# Patient Record
(demographics unavailable — no encounter records)

---

## 2024-10-26 NOTE — ASSESSMENT
[FreeTextEntry1] : 40 y/o female presents with recent episode of biliary pancreatitis. Likely has been having biliary colic symptoms for months. Discussed need for minimally invasive cholecystectomy with possible intra-operative cholangiogram/CBDE. Will need to repeat blood work and obtain MRCP to assess pancreatitis resolution and clear CBD. Risks/benefits/surgical details and expected recovery explained. All questions answered. She wishes to proceed soon. Patient has been instructed to present to [facility] ER should she develop recurrent symptoms prior to scheduled surgery.

## 2024-10-26 NOTE — REASON FOR VISIT
[Initial Consultation] : an initial consultation for [Referred By: ___] : Referred By: [unfilled] [FreeTextEntry2] : biliary pancreatitis

## 2024-10-26 NOTE — HISTORY OF PRESENT ILLNESS
[de-identified] : Ms. Stover is a 40 y/o female who recently presented to outside hospital ER with severe abdominal pain, nausea 10/20/2024. Blood work showed elevated lipase > 3000 and mildly elevated total bilirubin at 1.5 mg/dL.  AST/ALT/ALK PHOS were also elevated at 330/222/382 U/L. Abdominal ultrasound 10/20/2024 showed multiple gallstones with normal CBD measuring 6 mm. She has a history of known gallstones with RUQ abdominal pain over the last 6 months. Abdominal ultrasound performed for evaluation 09/17/2024 multiple gallstones with CBD measuring 2 mm. US performed 04/15/2024 did not show gallstone per report.  Since her discharge, she continues to have low intensity epigastric/RUQ abdominal pain but denies fever, nausea/emesis. She wishes to proceed with cholecystectomy.

## 2024-10-26 NOTE — CONSULT LETTER
[Dear  ___] : Dear  [unfilled], [Consult Letter:] : I had the pleasure of evaluating your patient, [unfilled]. [Please see my note below.] : Please see my note below. [Consult Closing:] : Thank you very much for allowing me to participate in the care of this patient.  If you have any questions, please do not hesitate to contact me. [Sincerely,] : Sincerely, [FreeTextEntry2] : Dr. Zoltan Schilling [FreeTextEntry3] : Dino Madsen (M) [phone] (O) [phone]

## 2024-11-20 NOTE — ASSESSMENT
[FreeTextEntry1] : Continue diet with fatty/oily food minimization. Mild activity for another 2 weeks. May return to office as clinically indicated.

## 2024-11-20 NOTE — HISTORY OF PRESENT ILLNESS
[de-identified] : Ms. Stover is a 40 y/o female who recently presented to outside hospital ER with severe abdominal pain, nausea 10/20/2024. Blood work showed elevated lipase > 3000 and mildly elevated total bilirubin at 1.5 mg/dL.  AST/ALT/ALK PHOS were also elevated at 330/222/382 U/L. Abdominal ultrasound 10/20/2024 showed multiple gallstones with normal CBD measuring 6 mm. She has a history of known gallstones with RUQ abdominal pain over the last 6 months. Abdominal ultrasound performed for evaluation 09/17/2024 multiple gallstones with CBD measuring 2 mm. US performed 04/15/2024 did not show gallstone per report.  Since her discharge, she continues to have low intensity epigastric/RUQ abdominal pain but denies fever, nausea/emesis. She wishes to proceed with cholecystectomy.  11/19/2024: Patient is s/p robotic cholecystectomy 11/04/2024.  She is recovering well. Pathology: chronic cholecystitis/cholelithiasis/cholesterolosis.